# Patient Record
Sex: MALE
[De-identification: names, ages, dates, MRNs, and addresses within clinical notes are randomized per-mention and may not be internally consistent; named-entity substitution may affect disease eponyms.]

---

## 2021-05-20 PROBLEM — Z00.00 ENCOUNTER FOR PREVENTIVE HEALTH EXAMINATION: Status: ACTIVE | Noted: 2021-05-20

## 2021-05-25 ENCOUNTER — APPOINTMENT (OUTPATIENT)
Dept: OTOLARYNGOLOGY | Facility: CLINIC | Age: 56
End: 2021-05-25
Payer: COMMERCIAL

## 2021-05-25 VITALS
BODY MASS INDEX: 37.22 KG/M2 | TEMPERATURE: 96.6 F | DIASTOLIC BLOOD PRESSURE: 92 MMHG | WEIGHT: 260 LBS | SYSTOLIC BLOOD PRESSURE: 146 MMHG | HEIGHT: 70 IN | HEART RATE: 91 BPM

## 2021-05-25 DIAGNOSIS — Z78.9 OTHER SPECIFIED HEALTH STATUS: ICD-10-CM

## 2021-05-25 DIAGNOSIS — G47.30 SLEEP APNEA, UNSPECIFIED: ICD-10-CM

## 2021-05-25 DIAGNOSIS — Z83.3 FAMILY HISTORY OF DIABETES MELLITUS: ICD-10-CM

## 2021-05-25 DIAGNOSIS — H93.12 TINNITUS, LEFT EAR: ICD-10-CM

## 2021-05-25 PROCEDURE — 99203 OFFICE O/P NEW LOW 30 MIN: CPT

## 2021-05-25 PROCEDURE — 99072 ADDL SUPL MATRL&STAF TM PHE: CPT

## 2021-05-25 PROCEDURE — 92557 COMPREHENSIVE HEARING TEST: CPT

## 2021-05-25 PROCEDURE — 92567 TYMPANOMETRY: CPT

## 2021-05-25 RX ORDER — LEVOTHYROXINE SODIUM 0.05 MG/1
50 TABLET ORAL
Qty: 90 | Refills: 0 | Status: ACTIVE | COMMUNITY
Start: 2021-03-16

## 2021-05-25 NOTE — HISTORY OF PRESENT ILLNESS
[de-identified] : 56 y.o male presents for evaluation. History of snoring for past 10-15 years. No noticeable gasping in the middle of night. Admits to tossing and turning in bed. Admits to some daytime somnolence. He denies any headaches. Admits to weight gain especially over past few years. Never had a sleep study. Can breath well through his nose. Also has a history of tinnitus in left ear for past 15 years. Described as a solid noise that can range in frequency. Does admit to decreased hearing on left when compared to right.\par

## 2021-05-25 NOTE — PHYSICAL EXAM
[Midline] : trachea located in midline position [de-identified] : MALAMPATI CLASS 4 [Normal] : no rashes

## 2021-05-25 NOTE — REVIEW OF SYSTEMS
[Hearing Loss] : hearing loss [Recurrent Ear Infections] : recurrent ear infections [Ear Noises] : ear noises [Problem Snoring] : problem snoring [Heartburn] : heartburn [Negative] : Heme/Lymph [Patient Intake Form Reviewed] : Patient intake form was reviewed [FreeTextEntry8] : weight loss

## 2021-05-25 NOTE — ASSESSMENT
[FreeTextEntry1] :  MARY SYMETRICAL HEARIG LOSS\par TINNITUS COULD BE RELATED TO LOUD NOISE EXPOSURE AND HEARING LOSS\par SLEEP STUDY\par WEIGHT LOSS DISCUSSED\par F/U AFTER ABOVE

## 2025-07-08 ENCOUNTER — APPOINTMENT (OUTPATIENT)
Dept: ORTHOPEDIC SURGERY | Facility: CLINIC | Age: 60
End: 2025-07-08

## 2025-07-08 PROCEDURE — 99204 OFFICE O/P NEW MOD 45 MIN: CPT | Mod: 25

## 2025-07-08 PROCEDURE — 72040 X-RAY EXAM NECK SPINE 2-3 VW: CPT

## 2025-07-08 RX ORDER — CYCLOBENZAPRINE HYDROCHLORIDE 5 MG/1
5 TABLET, FILM COATED ORAL 3 TIMES DAILY
Qty: 60 | Refills: 0 | Status: ACTIVE | COMMUNITY
Start: 2025-07-08 | End: 1900-01-01

## 2025-07-08 RX ORDER — METHYLPREDNISOLONE 4 MG/1
4 TABLET ORAL
Qty: 1 | Refills: 0 | Status: ACTIVE | COMMUNITY
Start: 2025-07-08 | End: 1900-01-01

## 2025-08-19 ENCOUNTER — APPOINTMENT (OUTPATIENT)
Dept: ORTHOPEDIC SURGERY | Facility: CLINIC | Age: 60
End: 2025-08-19
Payer: COMMERCIAL

## 2025-08-19 VITALS — WEIGHT: 240 LBS | HEIGHT: 70 IN | BODY MASS INDEX: 34.36 KG/M2

## 2025-08-19 DIAGNOSIS — M54.2 CERVICALGIA: ICD-10-CM

## 2025-08-19 PROCEDURE — 99213 OFFICE O/P EST LOW 20 MIN: CPT
